# Patient Record
Sex: MALE | Race: WHITE | NOT HISPANIC OR LATINO | URBAN - METROPOLITAN AREA
[De-identification: names, ages, dates, MRNs, and addresses within clinical notes are randomized per-mention and may not be internally consistent; named-entity substitution may affect disease eponyms.]

---

## 2017-06-20 ENCOUNTER — EMERGENCY (EMERGENCY)
Facility: HOSPITAL | Age: 52
LOS: 1 days | Discharge: HOME | End: 2017-06-20
Admitting: EMERGENCY MEDICINE
Payer: COMMERCIAL

## 2017-06-20 VITALS
DIASTOLIC BLOOD PRESSURE: 91 MMHG | HEART RATE: 94 BPM | SYSTOLIC BLOOD PRESSURE: 147 MMHG | OXYGEN SATURATION: 97 % | RESPIRATION RATE: 17 BRPM | TEMPERATURE: 98 F

## 2017-06-20 VITALS — WEIGHT: 160.06 LBS | HEIGHT: 68 IN

## 2017-06-20 DIAGNOSIS — I10 ESSENTIAL (PRIMARY) HYPERTENSION: ICD-10-CM

## 2017-06-20 DIAGNOSIS — E78.5 HYPERLIPIDEMIA, UNSPECIFIED: ICD-10-CM

## 2017-06-20 DIAGNOSIS — R60.0 LOCALIZED EDEMA: ICD-10-CM

## 2017-06-20 DIAGNOSIS — M79.89 OTHER SPECIFIED SOFT TISSUE DISORDERS: ICD-10-CM

## 2017-06-20 LAB
D DIMER BLD IA.RAPID-MCNC: <150 NG/ML DDU — SIGNIFICANT CHANGE UP
HCT VFR BLD CALC: 40.5 % — SIGNIFICANT CHANGE UP (ref 39–50)
HGB BLD-MCNC: 14.5 G/DL — SIGNIFICANT CHANGE UP (ref 13–17)
MCHC RBC-ENTMCNC: 32 PG — SIGNIFICANT CHANGE UP (ref 27–34)
MCHC RBC-ENTMCNC: 35.8 G/DL — SIGNIFICANT CHANGE UP (ref 32–36)
MCV RBC AUTO: 89.4 FL — SIGNIFICANT CHANGE UP (ref 80–100)
PLATELET # BLD AUTO: 187 K/UL — SIGNIFICANT CHANGE UP (ref 150–400)
RBC # BLD: 4.53 M/UL — SIGNIFICANT CHANGE UP (ref 4.2–5.8)
RBC # FLD: 11.8 % — SIGNIFICANT CHANGE UP (ref 10.3–16.9)
WBC # BLD: 4.6 K/UL — SIGNIFICANT CHANGE UP (ref 3.8–10.5)
WBC # FLD AUTO: 4.6 K/UL — SIGNIFICANT CHANGE UP (ref 3.8–10.5)

## 2017-06-20 PROCEDURE — 99284 EMERGENCY DEPT VISIT MOD MDM: CPT

## 2017-06-20 NOTE — ED ADULT NURSE NOTE - CHPI ED SYMPTOMS NEG
no stiffness/no numbness/no fever/no bruising/no weakness/no deformity/no difficulty bearing weight/no back pain/no abrasion/no tingling

## 2017-06-20 NOTE — ED PROVIDER NOTE - OBJECTIVE STATEMENT
50 yo M w/ history of HLD and HTN c/o RLE swelling/redness/tingling 5 days ago. Pt notes onset of symptoms 5 days ago after work; pt had been standing at his standing desk. Pt went to the gym and notes the symptoms resolved after exercise. Pt has been asymptomatic since then. Pt was sent in from PMD's office for evaluation; EKG at PMD's office NSR. Denies break in the skin, paresthesia, numbness, tingling, bleeding, HA, dizziness, SOB, CP, palpitations, N/V, focal weakness. Last long flight was April 2017; approximately 8 hrs. 50 yo M w/ history of HLD and HTN c/o RLE swelling/redness/tingling 5 days ago. Pt notes onset of symptoms 5 days ago after work; pt had been standing at his standing desk. Pt went to the gym and notes the symptoms resolved after exercise. Pt has been asymptomatic since then. Pt was sent in from PMD's office for evaluation; EKG at PMD's office NSR with no acute findings. Denies break in the skin, paresthesia, numbness, tingling, bleeding, HA, dizziness, SOB, CP, palpitations, N/V, focal weakness, trauma, fall, redness, warmth, rash, and palpitations. Last long flight was April 2017; approximately 8 hrs.

## 2017-06-20 NOTE — ED ADULT NURSE NOTE - OBJECTIVE STATEMENT
aox3 verbal, ambulatory well appearing c/o right foot swelling yesterday that has since resolved. denies any cp, no sob.

## 2017-06-20 NOTE — ED PROVIDER NOTE - MEDICAL DECISION MAKING DETAILS
AFVSS at time of d/c, pt non-toxic appearing and hemodynamically stable, results, ddx, and f/u plans discussed with pt at bedside, d/c'd home to f/u with PMD, strict return precautions discussed, prompt return to ER for any worsening or new sx, pt verbalized understanding. pt with atraumatic RLE swelling 5d ago, resolved spontaneously after elevation overnight, currently asymptomatic, physical exam unremarkable, labs with neg d-dimer and cbc wnl, Well's criteria score of 0, unlikely for DVT/PE, AFVSS at time of d/c, pt non-toxic appearing and hemodynamically stable, results, ddx, and f/u plans discussed with pt at bedside, d/c'd home to f/u with PMD, strict return precautions discussed, prompt return to ER for any worsening or new sx, pt verbalized understanding.

## 2017-06-20 NOTE — ED PROVIDER NOTE - CHPI ED SYMPTOMS NEG
no abrasion/no stiffness/no bruising/no numbness/no weakness/no deformity/no difficulty bearing weight/no fever/no tingling

## 2021-05-26 PROBLEM — E78.5 HYPERLIPIDEMIA, UNSPECIFIED: Chronic | Status: ACTIVE | Noted: 2017-06-20

## 2021-05-26 PROBLEM — I10 ESSENTIAL (PRIMARY) HYPERTENSION: Chronic | Status: ACTIVE | Noted: 2017-06-20

## 2021-06-03 ENCOUNTER — APPOINTMENT (OUTPATIENT)
Dept: UROLOGY | Facility: CLINIC | Age: 56
End: 2021-06-03
Payer: COMMERCIAL

## 2021-06-03 ENCOUNTER — NON-APPOINTMENT (OUTPATIENT)
Age: 56
End: 2021-06-03

## 2021-06-03 VITALS
DIASTOLIC BLOOD PRESSURE: 88 MMHG | HEART RATE: 78 BPM | TEMPERATURE: 98.3 F | WEIGHT: 142 LBS | HEIGHT: 68 IN | SYSTOLIC BLOOD PRESSURE: 134 MMHG | BODY MASS INDEX: 21.52 KG/M2

## 2021-06-03 DIAGNOSIS — Z00.00 ENCOUNTER FOR GENERAL ADULT MEDICAL EXAMINATION W/OUT ABNORMAL FINDINGS: ICD-10-CM

## 2021-06-03 PROCEDURE — 99072 ADDL SUPL MATRL&STAF TM PHE: CPT

## 2021-06-03 PROCEDURE — 99204 OFFICE O/P NEW MOD 45 MIN: CPT

## 2021-06-03 NOTE — LETTER BODY
[Dear  ___] : Dear  [unfilled], [Consult Letter:] : I had the pleasure of evaluating your patient, [unfilled]. [Please see my note below.] : Please see my note below. [Consult Closing:] : Thank you very much for allowing me to participate in the care of this patient.  If you have any questions, please do not hesitate to contact me. [FreeTextEntry3] : Best Regards, \par \par Licha Flores MD\par

## 2021-06-03 NOTE — PHYSICAL EXAM
[General Appearance - Well Developed] : well developed [General Appearance - Well Nourished] : well nourished [Normal Appearance] : normal appearance [Well Groomed] : well groomed [General Appearance - In No Acute Distress] : no acute distress [Abdomen Soft] : soft [Abdomen Tenderness] : non-tender [Abdomen Mass (___ Cm)] : no abdominal mass palpated [Costovertebral Angle Tenderness] : no ~M costovertebral angle tenderness [Urethral Meatus] : meatus normal [Penis Abnormality] : normal uncircumcised penis [Urinary Bladder Findings] : the bladder was normal on palpation [Scrotum] : the scrotum was normal [Epididymis] : the epididymides were normal [Testes Tenderness] : no tenderness of the testes [Testes Mass (___cm)] : there were no testicular masses [FreeTextEntry1] : Patient declined PSA and ART since it was just done last week with PCP.

## 2021-06-03 NOTE — ASSESSMENT
[FreeTextEntry1] : We discussed evaluation of his chronic microscopic hematuria and history of renal stone disease and I recommended a CT scan without contrast at the present time a urine culture and cytology and these were ordered we will reconvene in 3 weeks to review these results.  In the meantime I instructed him to notify me if he has any change in his symptomatology and to forward copies of his recent PSA testing to me when it is ready.\par \par As for the left inguinal hernia this is completely asymptomatic and appears more to be a bulge than an actual hernia sac.  I instructed him on the unlikely scenario of incarceration.  We reviewed the symptoms and appropriate action to be taken if they should occur. Licha Flores MD\par

## 2021-06-03 NOTE — HISTORY OF PRESENT ILLNESS
[FreeTextEntry1] : 56 YO M seen TODAY as a NPT.  This patient was a prior patient of Dr. ALTAGRACIA DA SILVA [facility] and was last seen in 2017.  He has a history of a 4 mm right UPJ stone and a 6 mm left renal stone and these have been stable.  He comes today for interval follow-up of these stones.  He denies any renal colic has not passed a stone has no other gross hematuria no dysuria and nocturia once a night.  Patient tells me that he had a recent evaluation by his PCP and this included a ART and PSA evaluation he declines these today past history significant for right inguinal herniorrhaphy as a child.\par \par IPSS  11/35\par CONNOR 15/25\par DULCE 0/10 \par UA small blood.\par \par Patient is on no medication and no known medication allergies.\par \par  The patient denies fevers, chills, nausea and or vomiting and no unexplained weight loss. \par All pertinent parts of the patient PFSH (past medical, family and social histories), laboratory, radiological studies and physician notes were reviewed prior to starting the face to face portion of the telemedicine visit. Questionnaire results were discussed with patient.\par

## 2021-06-04 ENCOUNTER — RESULT CHARGE (OUTPATIENT)
Age: 56
End: 2021-06-04

## 2021-06-04 PROBLEM — Z00.00 ENCOUNTER FOR PREVENTIVE HEALTH EXAMINATION: Status: ACTIVE | Noted: 2021-05-26

## 2021-06-04 LAB
APPEARANCE: CLEAR
BACTERIA: NEGATIVE
BILIRUBIN URINE: NEGATIVE
BLOOD URINE: NEGATIVE
COLOR: NORMAL
GLUCOSE QUALITATIVE U: NEGATIVE
HYALINE CASTS: 0 /LPF
KETONES URINE: NEGATIVE
LEUKOCYTE ESTERASE URINE: NEGATIVE
MICROSCOPIC-UA: NORMAL
NITRITE URINE: NEGATIVE
PH URINE: 6.5
PROTEIN URINE: NEGATIVE
RED BLOOD CELLS URINE: 1 /HPF
SPECIFIC GRAVITY URINE: 1.01
SQUAMOUS EPITHELIAL CELLS: 0 /HPF
UROBILINOGEN URINE: NORMAL
WHITE BLOOD CELLS URINE: 0 /HPF

## 2021-06-07 LAB
BACTERIA UR CULT: NORMAL
BILIRUB UR QL STRIP: NORMAL
CLARITY UR: CLEAR
COLLECTION METHOD: NORMAL
GLUCOSE UR-MCNC: NORMAL
HCG UR QL: 0.2 EU/DL
HGB UR QL STRIP.AUTO: NORMAL
KETONES UR-MCNC: NORMAL
LEUKOCYTE ESTERASE UR QL STRIP: NORMAL
NITRITE UR QL STRIP: NORMAL
PH UR STRIP: 6.5
PROT UR STRIP-MCNC: NORMAL
SP GR UR STRIP: 1.02
URINE CYTOLOGY: NORMAL

## 2021-06-30 ENCOUNTER — APPOINTMENT (OUTPATIENT)
Dept: UROLOGY | Facility: CLINIC | Age: 56
End: 2021-06-30
Payer: COMMERCIAL

## 2021-07-01 ENCOUNTER — APPOINTMENT (OUTPATIENT)
Dept: UROLOGY | Facility: CLINIC | Age: 56
End: 2021-07-01

## 2021-07-14 ENCOUNTER — APPOINTMENT (OUTPATIENT)
Dept: UROLOGY | Facility: CLINIC | Age: 56
End: 2021-07-14
Payer: COMMERCIAL

## 2021-07-14 VITALS — TEMPERATURE: 97.2 F | DIASTOLIC BLOOD PRESSURE: 79 MMHG | HEART RATE: 93 BPM | SYSTOLIC BLOOD PRESSURE: 147 MMHG

## 2021-07-14 PROCEDURE — 99214 OFFICE O/P EST MOD 30 MIN: CPT

## 2021-07-14 PROCEDURE — 99072 ADDL SUPL MATRL&STAF TM PHE: CPT

## 2021-07-14 NOTE — LETTER BODY
[Dear  ___] : Dear  [unfilled], [Courtesy Letter:] : I had the pleasure of seeing your patient, [unfilled], in my office today. [Please see my note below.] : Please see my note below. [Consult Closing:] : Thank you very much for allowing me to participate in the care of this patient.  If you have any questions, please do not hesitate to contact me. [FreeTextEntry3] : Best Regards, \par \par Licha Flores MD\par

## 2021-07-14 NOTE — HISTORY OF PRESENT ILLNESS
[FreeTextEntry1] : 54 YO M seen 6/3/2021 as a NPT.  This patient was a prior patient of Dr. ALTAGRACIA BERMUDEZ and was last seen in 2017.  He has a history of a 4 mm right UPJ stone and a 6 mm left renal stone and these have been stable.  He comes today for interval follow-up of these stones.  He denies any renal colic has not passed a stone has no other gross hematuria no dysuria and nocturia once a night.  Patient tells me that he had a recent evaluation by his PCP and this included a ART and PSA evaluation he declines these today past history significant for right inguinal herniorrhaphy as a child.\par \par IPSS  11/35\par CONNOR 15/25\par DULCE 0/10 \par UA small blood.\par \par URine cytology negative, C+S negative.\par CT scan 7/6/2021 9 x 10 x 7 CM left simple cyst, 1 mm left stone, 4 mm right stone chronic. PSA done by PCP by history.\par \par Patient seen TODAY 7/14/2021 to review CT scan results and plan next steps. He has been asymptomatic since last visit. No gross hematuria.\par PSA from 5/20/2021 1.1 done at PCP, patient showed me the results from the patient portal on his phone. \par \par UA trace heme today\par IPSS 13/35\par CONNOR 25/25\par DULCE 5/10\par \par Patient is on no medication and no known medication allergies.\par \par  The patient denies fevers, chills, nausea and or vomiting and no unexplained weight loss. \par All pertinent parts of the patient PFSH (past medical, family and social histories), laboratory, radiological studies and physician notes were reviewed prior to starting the face to face portion of the visit. Questionnaire results were discussed with patient.\par

## 2021-07-14 NOTE — ASSESSMENT
[FreeTextEntry1] : We discussed the results of the CT scan today with respect to the bilateral kidney stones which are not causing obstruction or symptoms at this point and the large left simple renal cyst which again is not causing any symptoms at this point.  I recommended that he undergo a KUB x-ray at this time to confirm the size and density of the renal calculi.  We will continue to follow the left renal cyst with ultrasound in the future.\par \par If the patient remains asymptomatic we will plan to follow up in 6 months. Licha Flores MD\par

## 2022-01-12 ENCOUNTER — APPOINTMENT (OUTPATIENT)
Dept: UROLOGY | Facility: CLINIC | Age: 57
End: 2022-01-12

## 2022-01-12 NOTE — HISTORY OF PRESENT ILLNESS
[FreeTextEntry1] : 57 YO M seen 6/3/2021 as a NPT.  This patient was a prior patient of Dr. ALTAGRACIA DA SILVA [facility] and was last seen in 2017.  He has a history of a 4 mm right UPJ stone and a 6 mm left renal stone and these have been stable.  He comes today for interval follow-up of these stones.  He denies any renal colic has not passed a stone has no other gross hematuria no dysuria and nocturia once a night.  Patient tells me that he had a recent evaluation by his PCP and this included a ART and PSA evaluation he declines these today past history significant for right inguinal herniorrhaphy as a child.\par \par IPSS  11/35\par CONNOR 15/25\par DULCE 0/10 \par UA small blood.\par \par Urine cytology negative, C+S negative.\par CT scan 7/6/2021 9 x 10 x 7 CM left simple cyst, 1 mm left stone, 4 mm right stone chronic. PSA done by PCP by history.\par \par Patient seen  7/14/2021 to review CT scan results and plan next steps. He has been asymptomatic since last visit. No gross hematuria.\par PSA from 5/20/2021 1.1 done at PCP, patient showed me the results from the patient portal on his phone. \par UA trace heme\par IPSS 13/35\par CONNOR 25/25\par DULCE 5/10\par We planned to follow the asymptomatic stones with KUB X-ray and periodic renal US.\par \par Patient cancelled 1/12/2022, home with COVID. \par \par

## 2022-02-09 ENCOUNTER — APPOINTMENT (OUTPATIENT)
Dept: UROLOGY | Facility: CLINIC | Age: 57
End: 2022-02-09

## 2022-02-09 NOTE — HISTORY OF PRESENT ILLNESS
[FreeTextEntry1] : 55 YO M seen 6/3/2021 as a NPT.  This patient was a prior patient of Dr. ALATGRACIA DA SILVA [facility] and was last seen in 2017.  He has a history of a 4 mm right UPJ stone and a 6 mm left renal stone and these have been stable.  He comes today for interval follow-up of these stones.  He denies any renal colic has not passed a stone has no other gross hematuria no dysuria and nocturia once a night.  Patient tells me that he had a recent evaluation by his PCP and this included a ART and PSA evaluation he declines these today past history significant for right inguinal herniorrhaphy as a child.\par \par IPSS  11/35\par CONNOR 15/25\par DULCE 0/10 \par UA small blood.\par \par Urine cytology negative, C+S negative.\par CT scan 7/6/2021 9 x 10 x 7 CM left simple cyst, 1 mm left stone, 4 mm right stone chronic. PSA done by PCP by history.\par \par Patient seen  7/14/2021 to review CT scan results and plan next steps. He has been asymptomatic since last visit. No gross hematuria.\par PSA from 5/20/2021 1.1 done at PCP, patient showed me the results from the patient portal on his phone. \par UA trace heme\par IPSS 13/35\par CONNOR 25/25\par DULCE 5/10\par We planned to follow the asymptomatic stones with KUB X-ray and periodic renal US.\par \par Patient cancelled 1/12/2022, home with COVID. \par \par Patient seen TODAY 2/9/2022 for interval FU.\par \par

## 2022-02-24 ENCOUNTER — APPOINTMENT (OUTPATIENT)
Dept: UROLOGY | Facility: CLINIC | Age: 57
End: 2022-02-24

## 2022-03-10 ENCOUNTER — APPOINTMENT (OUTPATIENT)
Dept: UROLOGY | Facility: CLINIC | Age: 57
End: 2022-03-10

## 2022-04-21 ENCOUNTER — APPOINTMENT (OUTPATIENT)
Dept: UROLOGY | Facility: CLINIC | Age: 57
End: 2022-04-21
Payer: COMMERCIAL

## 2022-04-21 VITALS
HEART RATE: 99 BPM | BODY MASS INDEX: 21.67 KG/M2 | SYSTOLIC BLOOD PRESSURE: 138 MMHG | WEIGHT: 143 LBS | DIASTOLIC BLOOD PRESSURE: 89 MMHG | TEMPERATURE: 98.3 F | HEIGHT: 68 IN

## 2022-04-21 DIAGNOSIS — K40.90 UNILATERAL INGUINAL HERNIA, W/OUT OBSTRUCTION OR GANGRENE, NOT SPECIFIED AS RECURRENT: ICD-10-CM

## 2022-04-21 PROCEDURE — 99214 OFFICE O/P EST MOD 30 MIN: CPT

## 2022-04-21 PROCEDURE — 76775 US EXAM ABDO BACK WALL LIM: CPT

## 2022-04-21 NOTE — PHYSICAL EXAM
[General Appearance - Well Developed] : well developed [General Appearance - Well Nourished] : well nourished [Normal Appearance] : normal appearance [Well Groomed] : well groomed [General Appearance - In No Acute Distress] : no acute distress [Abdomen Soft] : soft [Abdomen Tenderness] : non-tender [Costovertebral Angle Tenderness] : no ~M costovertebral angle tenderness [Urethral Meatus] : meatus normal [Penis Abnormality] : normal circumcised penis [Scrotum] : the scrotum was normal [Epididymis] : the epididymides were normal [Testes Tenderness] : no tenderness of the testes [Testes Mass (___cm)] : there were no testicular masses [Prostate Tenderness] : the prostate was not tender [No Prostate Nodules] : no prostate nodules [Prostate Size ___ (0-4)] : prostate size [unfilled] (scale: 0-4) [Edema] : no peripheral edema [] : no respiratory distress [Respiration, Rhythm And Depth] : normal respiratory rhythm and effort [Exaggerated Use Of Accessory Muscles For Inspiration] : no accessory muscle use [Oriented To Time, Place, And Person] : oriented to person, place, and time [Affect] : the affect was normal [Mood] : the mood was normal [Not Anxious] : not anxious [Normal Station and Gait] : the gait and station were normal for the patient's age [No Focal Deficits] : no focal deficits [FreeTextEntry1] : LIH with slight bulge but no palpable sac, stable from exam last year.

## 2022-04-21 NOTE — HISTORY OF PRESENT ILLNESS
[FreeTextEntry1] : 57 YO M seen 6/3/2021 as a NPT.  This patient was a prior patient of Dr. ALTAGRACIA BERMUDEZ and was last seen in 2017.  He has a history of a 4 mm right UPJ stone and a 6 mm left renal stone and these have been stable.  He comes today for interval follow-up of these stones.  He denies any renal colic has not passed a stone has no other gross hematuria no dysuria and nocturia once a night.  Patient tells me that he had a recent evaluation by his PCP and this included a ART and PSA evaluation he declines these today past history significant for right inguinal herniorrhaphy as a child.\par \par IPSS  11/35\par CONNOR 15/25\par DULCE 0/10 \par UA small blood.\par \par Urine cytology negative, C+S negative.\par CT scan 7/6/2021 9 x 10 x 7 CM left simple cyst, 1 mm left stone, 4 mm right stone chronic. PSA done by PCP by history.\par \par Patient seen  7/14/2021 to review CT scan results and plan next steps. He has been asymptomatic since last visit. No gross hematuria.\par PSA from 5/20/2021 1.1 done at PCP, patient showed me the results from the patient portal on his phone. \par UA trace heme\par IPSS 13/35\par CONNOR 25/25\par DULCE 5/10\par We planned to follow the asymptomatic stones with KUB X-ray and periodic renal US.\par \par Patient cancelled 1/12/2022, home with [facility], cancelled 2/29/2022. \par \par Patient seen TODAY 4/21/2022 for interval FU. HA has been asymptomatic. No gross hematuria or renal colic. He had a KUB X-ray 4/20/2022. Report not ready, but I reviewed the films on the [facility] portal, no stones seen, bilateral pelvic phleboliths. He has noted no change in the LIH bulge.\par UA moderate RBC\par Renal US (in office): Mildly dilated lower pole calyces, 4 mm likely upper pole stone, not obstructing. stable 10 cm left simple cyst, no hydronephrosis or stones on the left. \par \par  The patient denies fevers, chills, nausea and or vomiting and no unexplained weight loss. \par All pertinent parts of the patient PFSH (past medical, family and social histories), laboratory, radiological studies and physician notes were reviewed prior to starting the face to face portion of the  visit. Questionnaire results were discussed with patient.\par \par \par

## 2022-04-21 NOTE — ASSESSMENT
[FreeTextEntry1] : We discussed today the results of the patient's physical examination, KUB x-ray, renal ultrasound.  These indicate stable small radiolucent right renal calculus, stable large left simple cyst.  He also has a left inguinal hernia which is primarily bulge and not a sac.  This has also remained stable over the past year.\par \par I discussed and recommended that he continue to follow these issues without active intervention at this time.  We reviewed signs and symptoms of incarceration of the hernia and appropriate steps to take if they should occur.  As for the small kidney stone, we will follow this up in 1 year with a repeat renal ultrasound unless his symptoms change in the interim.  We will also follow the left simple cyst since it does not require intervention right now.\par \par I also sent urine today for culture and cytology blood for PSA and BMP. Licha Flores MD\par

## 2022-04-22 LAB
ANION GAP SERPL CALC-SCNC: 12 MMOL/L
BILIRUB UR QL STRIP: NORMAL
BUN SERPL-MCNC: 11 MG/DL
CALCIUM SERPL-MCNC: 9.8 MG/DL
CHLORIDE SERPL-SCNC: 105 MMOL/L
CO2 SERPL-SCNC: 23 MMOL/L
CREAT SERPL-MCNC: 0.79 MG/DL
EGFR: 105 ML/MIN/1.73M2
GLUCOSE SERPL-MCNC: 82 MG/DL
GLUCOSE UR-MCNC: NORMAL
HCG UR QL: 0.2 EU/DL
HGB UR QL STRIP.AUTO: NORMAL
KETONES UR-MCNC: NORMAL
LEUKOCYTE ESTERASE UR QL STRIP: NORMAL
NITRITE UR QL STRIP: NORMAL
PH UR STRIP: 7
POTASSIUM SERPL-SCNC: 4.2 MMOL/L
PROT UR STRIP-MCNC: NORMAL
PSA SERPL-MCNC: 1.75 NG/ML
SODIUM SERPL-SCNC: 140 MMOL/L
SP GR UR STRIP: 1.01

## 2022-04-27 LAB — BACTERIA UR CULT: NORMAL

## 2022-04-28 LAB — URINE CYTOLOGY: NORMAL

## 2022-11-02 ENCOUNTER — APPOINTMENT (OUTPATIENT)
Dept: UROLOGY | Facility: CLINIC | Age: 57
End: 2022-11-02

## 2022-11-02 ENCOUNTER — RESULT CHARGE (OUTPATIENT)
Age: 57
End: 2022-11-02

## 2022-11-02 VITALS — SYSTOLIC BLOOD PRESSURE: 146 MMHG | DIASTOLIC BLOOD PRESSURE: 90 MMHG | HEART RATE: 80 BPM | TEMPERATURE: 98.4 F

## 2022-11-02 PROCEDURE — 99213 OFFICE O/P EST LOW 20 MIN: CPT

## 2022-11-02 NOTE — PHYSICAL EXAM
[General Appearance - Well Developed] : well developed [General Appearance - Well Nourished] : well nourished [Normal Appearance] : normal appearance [Well Groomed] : well groomed [General Appearance - In No Acute Distress] : no acute distress [Abdomen Soft] : soft [Abdomen Mass (___ Cm)] : no abdominal mass palpated [Costovertebral Angle Tenderness] : no ~M costovertebral angle tenderness [FreeTextEntry1] : Mild tenderness in RLQ to deep palpation, no rebound tenderness. [Oriented To Time, Place, And Person] : oriented to person, place, and time [Affect] : the affect was normal [Mood] : the mood was normal [Not Anxious] : not anxious

## 2022-11-02 NOTE — HISTORY OF PRESENT ILLNESS
[FreeTextEntry1] : 55 YO M seen 6/3/2021 as a NPT.  This patient was a prior patient of Dr. ALTAGRACIA BERMUDEZ and was last seen in 2017.  He has a history of a 4 mm right UPJ stone and a 6 mm left renal stone and these have been stable.  He comes today for interval follow-up of these stones.  He denies any renal colic has not passed a stone has no other gross hematuria no dysuria and nocturia once a night.  Patient tells me that he had a recent evaluation by his PCP and this included a ART and PSA evaluation he declines these today past history significant for right inguinal herniorrhaphy as a child.\par \par IPSS  11/35\par CONNOR 15/25\par DULCE 0/10 \par UA small blood.\par \par Urine cytology negative, C+S negative.\par CT scan 7/6/2021 9 x 10 x 7 CM left simple cyst, 1 mm left stone, 4 mm right stone chronic. PSA done by PCP by history.\par \par Patient seen  7/14/2021 to review CT scan results and plan next steps. He has been asymptomatic since last visit. No gross hematuria.\par PSA from 5/20/2021 1.1 done at PCP, patient showed me the results from the patient portal on his phone. \par UA trace heme\par IPSS 13/35\par CONNOR 25/25\par DULCE 5/10\par We planned to follow the asymptomatic stones with KUB X-ray and periodic renal US.\par \par Patient cancelled 1/12/2022, home with [facility], cancelled 2/29/2022. \par \par Patient seen  4/21/2022 for interval FU. HA has been asymptomatic. No gross hematuria or renal colic. He had a KUB X-ray 4/20/2022. Report not ready, but I reviewed the films on the [facility] portal, no stones seen, bilateral pelvic phleboliths. He has noted no change in the LIH bulge.\par UA moderate RBC\par Renal US (in office): Mildly dilated lower pole calyces, 4 mm likely upper pole stone, not obstructing. stable 10 cm left simple cyst, no hydronephrosis or stones on the left. \par We discussed today the results of the patient's physical examination, KUB x-ray, renal ultrasound. These indicate stable small radiolucent right renal calculus, stable large left simple cyst. He also has a left inguinal hernia which is primarily bulge and not a sac. This has also remained stable over the past year.\par I discussed and recommended that he continue to follow these issues without active intervention at this time. We reviewed signs and symptoms of incarceration of the hernia and appropriate steps to take if they should occur. As for the small kidney stone, we will follow this up in 1 year with a repeat renal ultrasound unless his symptoms change in the interim. We will also follow the left simple cyst since it does not require intervention right now.\par I also sent urine today for culture and cytology blood for PSA and BMP. \par PSA 1.75\par BMP normal\par C+S\par \par Patient seen TODAY 11/2/2022 due to right flank pain radiating to the RLQ which started 3 weeks ago this was constant and severe at times but resolved 1 week ago. He now has a tingling feeling in the penis and frequency and urgency. He has not captured any stones but feels that one is passing. Denies gross hematuria.\par UA trace RBC\par IPSS 21\par \par  The patient denies fevers, chills, nausea and or vomiting and no unexplained weight loss. \par All pertinent parts of the patient PFSH (past medical, family and social histories), laboratory, radiological studies and physician notes were reviewed prior to starting the face to face portion of the  visit. Questionnaire results were discussed with patient.\par \par \par

## 2022-11-02 NOTE — ASSESSMENT
[FreeTextEntry1] : Findings today are consistent with the process of either passing or having passed the known 3 mm right upper pole kidney stone.  I sent urine for culture today and asked the patient to return to the office later in the week 04 renal and pelvic ultrasound to confirm whether the stone is still present and to identify any signs of obstruction. Licha Flores MD\par

## 2022-11-04 ENCOUNTER — APPOINTMENT (OUTPATIENT)
Dept: UROLOGY | Facility: CLINIC | Age: 57
End: 2022-11-04

## 2022-11-04 LAB
BACTERIA UR CULT: NORMAL
BILIRUB UR QL STRIP: NORMAL
CLARITY UR: CLEAR
COLLECTION METHOD: NORMAL
GLUCOSE UR-MCNC: NORMAL
HCG UR QL: 0.2 EU/DL
HGB UR QL STRIP.AUTO: NORMAL
KETONES UR-MCNC: NORMAL
LEUKOCYTE ESTERASE UR QL STRIP: NORMAL
NITRITE UR QL STRIP: NORMAL
PH UR STRIP: 6
PROT UR STRIP-MCNC: NORMAL
SP GR UR STRIP: 1.02

## 2022-11-04 PROCEDURE — 76775 US EXAM ABDO BACK WALL LIM: CPT

## 2022-11-04 PROCEDURE — 99213 OFFICE O/P EST LOW 20 MIN: CPT

## 2022-11-04 PROCEDURE — 81003 URINALYSIS AUTO W/O SCOPE: CPT | Mod: QW

## 2022-11-04 PROCEDURE — 76857 US EXAM PELVIC LIMITED: CPT | Mod: 59

## 2022-11-04 NOTE — HISTORY OF PRESENT ILLNESS
[FreeTextEntry1] : 56 YO M seen 6/3/2021 as a NPT.  This patient was a prior patient of Dr. ALTAGRACIA BERMUDEZ and was last seen in 2017.  He has a history of a 4 mm right UPJ stone and a 6 mm left renal stone and these have been stable.  He comes today for interval follow-up of these stones.  He denies any renal colic has not passed a stone has no other gross hematuria no dysuria and nocturia once a night.  Patient tells me that he had a recent evaluation by his PCP and this included a ART and PSA evaluation he declines these today past history significant for right inguinal herniorrhaphy as a child.\par \par IPSS  11/35\par CONNOR 15/25\par DULCE 0/10 \par UA small blood.\par \par Urine cytology negative, C+S negative.\par CT scan 7/6/2021 9 x 10 x 7 CM left simple cyst, 1 mm left stone, 4 mm right stone chronic. PSA done by PCP by history.\par \par Patient seen  7/14/2021 to review CT scan results and plan next steps. He has been asymptomatic since last visit. No gross hematuria.\par PSA from 5/20/2021 1.1 done at PCP, patient showed me the results from the patient portal on his phone. \par UA trace heme\par IPSS 13/35\par CONNOR 25/25\par DULCE 5/10\par We planned to follow the asymptomatic stones with KUB X-ray and periodic renal US.\par \par Patient cancelled 1/12/2022, home with [facility], cancelled 2/29/2022. \par \par Patient seen  4/21/2022 for interval FU. HA has been asymptomatic. No gross hematuria or renal colic. He had a KUB X-ray 4/20/2022. Report not ready, but I reviewed the films on the [facility] portal, no stones seen, bilateral pelvic phleboliths. He has noted no change in the LIH bulge.\par UA moderate RBC\par Renal US (in office): Mildly dilated lower pole calyces, 4 mm likely upper pole stone, not obstructing. stable 10 cm left simple cyst, no hydronephrosis or stones on the left. \par We discussed today the results of the patient's physical examination, KUB x-ray, renal ultrasound. These indicate stable small radiolucent right renal calculus, stable large left simple cyst. He also has a left inguinal hernia which is primarily bulge and not a sac. This has also remained stable over the past year.\par I discussed and recommended that he continue to follow these issues without active intervention at this time. We reviewed signs and symptoms of incarceration of the hernia and appropriate steps to take if they should occur. As for the small kidney stone, we will follow this up in 1 year with a repeat renal ultrasound unless his symptoms change in the interim. We will also follow the left simple cyst since it does not require intervention right now.\par I also sent urine today for culture and cytology blood for PSA and BMP. \par PSA 1.75\par BMP normal\par C+S\par \par Patient seen  11/2/2022 due to right flank pain radiating to the RLQ which started 3 weeks ago this was constant and severe at times but resolved 1 week ago. He now has a tingling feeling in the penis and frequency and urgency. He has not captured any stones but feels that one is passing. Denies gross hematuria.\par UA trace RBC\par IPSS 21\par Findings today are consistent with the process of either passing or having passed the known 3 mm right upper pole kidney stone. I sent urine for culture today and asked the patient to return to the office later in the week for renal and pelvic ultrasound to confirm whether the stone is still present and to identify any signs of obstruction. \par C+S NA\par \par Patient seen TODAY 11/4/2022 for Renal and Pelvic US.\par UA trace RBC\par Renal US: left 11 cm simple cyst unchanged, no stones. right small simple cyst, prior stone not seen, no hydronephrosis or mass effect either kidney.\par Pelvic US: PVR 6 cc , 5 mm stone mobile in the bladder, Prostate 33 gm.\par \par  The patient denies fevers, chills, nausea and or vomiting and no unexplained weight loss. \par All pertinent parts of the patient PFSH (past medical, family and social histories), laboratory, radiological studies and physician notes were reviewed prior to starting the face to face portion of the  visit. Questionnaire results were discussed with patient.\par \par \par

## 2022-11-04 NOTE — ASSESSMENT
[FreeTextEntry1] : We reviewed the results of the renal and pelvic ultrasound today which demonstrated no hydronephrosis and the passage of the known right kidney stone into the bladder.  The stone was found to be mobile in the bladder and not obstructing the distal ureter.  We discussed likely passage of the stone with minimal symptoms I recommended he try to capture the stone for analysis.  We also sent urine today for culture and cytology and we will review these results as they come in.  We made plans for follow-up in 6 weeks to repeat the bladder ultrasound to confirm stone passage if he does not capture the stone. Licha Flores MD\par

## 2022-11-04 NOTE — LETTER BODY
[Dear  ___] : Dear  [unfilled], [Courtesy Letter:] : I had the pleasure of seeing your patient, [unfilled], in my office today. [Please see my note below.] : Please see my note below. [Consult Closing:] : Thank you very much for allowing me to participate in the care of this patient.  If you have any questions, please do not hesitate to contact me. [FreeTextEntry3] : Licha Flores MD\par

## 2022-11-07 ENCOUNTER — NON-APPOINTMENT (OUTPATIENT)
Age: 57
End: 2022-11-07

## 2022-11-07 LAB — BACTERIA UR CULT: NORMAL

## 2022-11-09 ENCOUNTER — NON-APPOINTMENT (OUTPATIENT)
Age: 57
End: 2022-11-09

## 2022-11-09 LAB — URINE CYTOLOGY: NORMAL

## 2022-11-16 LAB — NIDUS STONE QN: NORMAL

## 2023-10-10 ENCOUNTER — APPOINTMENT (OUTPATIENT)
Dept: UROLOGY | Facility: CLINIC | Age: 58
End: 2023-10-10
Payer: COMMERCIAL

## 2023-10-10 VITALS
OXYGEN SATURATION: 96 % | WEIGHT: 143 LBS | HEIGHT: 68 IN | TEMPERATURE: 98.2 F | DIASTOLIC BLOOD PRESSURE: 90 MMHG | BODY MASS INDEX: 21.67 KG/M2 | SYSTOLIC BLOOD PRESSURE: 146 MMHG

## 2023-10-10 DIAGNOSIS — R10.32 LEFT LOWER QUADRANT PAIN: ICD-10-CM

## 2023-10-10 PROCEDURE — 99214 OFFICE O/P EST MOD 30 MIN: CPT

## 2023-10-11 LAB
APPEARANCE: CLEAR
BACTERIA: NEGATIVE /HPF
BILIRUBIN URINE: NEGATIVE
BLOOD URINE: NEGATIVE
CAST: 0 /LPF
COLOR: YELLOW
EPITHELIAL CELLS: 0 /HPF
GLUCOSE QUALITATIVE U: NEGATIVE MG/DL
KETONES URINE: NEGATIVE MG/DL
LEUKOCYTE ESTERASE URINE: NEGATIVE
MICROSCOPIC-UA: NORMAL
NITRITE URINE: NEGATIVE
PH URINE: 5.5
PROTEIN URINE: NEGATIVE MG/DL
RED BLOOD CELLS URINE: 2 /HPF
SPECIFIC GRAVITY URINE: 1.02
UROBILINOGEN URINE: 0.2 MG/DL
WHITE BLOOD CELLS URINE: 0 /HPF

## 2023-10-12 ENCOUNTER — NON-APPOINTMENT (OUTPATIENT)
Age: 58
End: 2023-10-12

## 2023-10-12 LAB — BACTERIA UR CULT: NORMAL

## 2023-10-25 LAB — URINE CYTOLOGY: NORMAL

## 2023-11-02 ENCOUNTER — APPOINTMENT (OUTPATIENT)
Dept: UROLOGY | Facility: CLINIC | Age: 58
End: 2023-11-02
Payer: COMMERCIAL

## 2023-11-02 DIAGNOSIS — R79.89 OTHER SPECIFIED ABNORMAL FINDINGS OF BLOOD CHEMISTRY: ICD-10-CM

## 2023-11-02 DIAGNOSIS — N20.0 CALCULUS OF KIDNEY: ICD-10-CM

## 2023-11-02 DIAGNOSIS — N50.812 LEFT TESTICULAR PAIN: ICD-10-CM

## 2023-11-02 LAB
BILIRUB UR QL STRIP: NORMAL
CLARITY UR: CLEAR
COLLECTION METHOD: NORMAL
GLUCOSE UR-MCNC: NORMAL
HCG UR QL: 0.2 EU/DL
HGB UR QL STRIP.AUTO: ABNORMAL
KETONES UR-MCNC: NORMAL
LEUKOCYTE ESTERASE UR QL STRIP: NORMAL
NITRITE UR QL STRIP: NORMAL
PH UR STRIP: 6
PROT UR STRIP-MCNC: NORMAL
SP GR UR STRIP: 1.02

## 2023-11-02 PROCEDURE — 99214 OFFICE O/P EST MOD 30 MIN: CPT

## 2023-11-02 PROCEDURE — 76870 US EXAM SCROTUM: CPT

## 2023-11-02 PROCEDURE — 76775 US EXAM ABDO BACK WALL LIM: CPT

## 2023-11-02 PROCEDURE — 81003 URINALYSIS AUTO W/O SCOPE: CPT | Mod: QW

## 2023-11-14 ENCOUNTER — NON-APPOINTMENT (OUTPATIENT)
Age: 58
End: 2023-11-14

## 2023-11-15 LAB
BACTERIA UR CULT: NORMAL
ESTRADIOL SERPL-MCNC: 13 PG/ML
FSH SERPL-MCNC: 12.3 IU/L
LH SERPL-ACNC: 7.2 IU/L
PROLACTIN SERPL-MCNC: 6.4 NG/ML
TESTOST FREE SERPL-MCNC: 4.4 PG/ML
TESTOST SERPL-MCNC: 409 NG/DL
URINE CYTOLOGY: NORMAL

## 2023-11-30 ENCOUNTER — APPOINTMENT (OUTPATIENT)
Dept: CT IMAGING | Facility: CLINIC | Age: 58
End: 2023-11-30

## 2024-02-20 ENCOUNTER — APPOINTMENT (OUTPATIENT)
Dept: UROLOGY | Facility: CLINIC | Age: 59
End: 2024-02-20
Payer: COMMERCIAL

## 2024-02-20 DIAGNOSIS — N28.1 CYST OF KIDNEY, ACQUIRED: ICD-10-CM

## 2024-02-20 PROCEDURE — 99214 OFFICE O/P EST MOD 30 MIN: CPT

## 2024-02-20 NOTE — LETTER BODY
[Dear  ___] : Dear  [unfilled], [Courtesy Letter:] : I had the pleasure of seeing your patient, [unfilled], in my office today. [Please see my note below.] : Please see my note below. [Consult Closing:] : Thank you very much for allowing me to participate in the care of this patient.  If you have any questions, please do not hesitate to contact me. [FreeTextEntry3] : Best Regards,   Licha Flores MD

## 2024-02-20 NOTE — ASSESSMENT
[FreeTextEntry1] : We discussed the findings of the patient's recent CT scan which included normal upper urinary tract except for the bilateral simple renal cysts.  I reviewed with him the benign nature of the cyst and we discussed that intervention is unnecessary at this time for the cysts.  As for the small fat-containing right inguinal hernia seen on CT scan, he had a laparoscopic left inguinal hernia repair last year and this was not confirmed under direct visualization.  The CT finding is likely the residual after his previous right inguinal herniorrhaphy performed through open procedure at age 8.  He remains asymptomatic from this and no intervention is indicated at this time.  As for the chronic microscopic hematuria, I recommended that we continue and complete his evaluation with cystoscopy in the office under local anesthesia with nitrous oxide augmentation.  We reviewed the indications risks alternatives and chances for success with this approach and he is in agreement.  Staff will contact him to schedule this procedure. Licha Flores MD

## 2024-02-20 NOTE — HISTORY OF PRESENT ILLNESS
[Home] : at home, [unfilled] , at the time of the visit. [Medical Office: ([facility])___] : at the medical office located in  [Verbal consent obtained from patient] : the patient, [unfilled] [FreeTextEntry1] :  The patient-doctor relationship has been established in a face to face fashion via real time video/audio HIPAA compliant communication using telemedicine software. The patient's identity has been confirmed. The patient was previously emailed a copy of the telemedicine consent. They have had a chance to review and has now given verbal consent and has requested care to be assessed and treated through telemedicine and understands there maybe limitations in this process and they may need further follow up care in the office and or hospital settings.  Verbal consent given on 2/20/2024, at 8:30 AM, by Flo Cam.  59 YO M seen 6/3/2021 as a NPT.  This patient was a prior patient of Dr. ALTAGRACIA BERMUDEZ and was last seen in 2017.  He has a history of a 4 mm right UPJ stone and a 6 mm left renal stone and these have been stable.  He comes today for interval follow-up of these stones.  He denies any renal colic has not passed a stone has no other gross hematuria no dysuria and nocturia once a night.  Patient tells me that he had a recent evaluation by his PCP and this included a ART and PSA evaluation he declines these today past history significant for right inguinal herniorrhaphy as a child.  IPSS  11/35 CONNOR 15/25 DULCE 0/10  UA small blood.  Urine cytology negative, C+S negative. CT scan 7/6/2021 9 x 10 x 7 CM left simple cyst, 1 mm left stone, 4 mm right stone chronic. PSA done by PCP by history.  Patient seen  7/14/2021 to review CT scan results and plan next steps. He has been asymptomatic since last visit. No gross hematuria. PSA from 5/20/2021 1.1 done at PCP, patient showed me the results from the patient portal on his phone.  UA trace heme IPSS 13/35 CONNOR 25/25 DULCE 5/10 We planned to follow the asymptomatic stones with KUB X-ray and periodic renal US.  Patient cancelled 1/12/2022, home with COVID, cancelled 2/29/2022.   Patient seen  4/21/2022 for interval FU. HA has been asymptomatic. No gross hematuria or renal colic. He had a KUB X-ray 4/20/2022. Report not ready, but I reviewed the films on the [facility] portal, no stones seen, bilateral pelvic phleboliths. He has noted no change in the LIH bulge. UA moderate RBC Renal US (in office): Mildly dilated lower pole calyces, 4 mm likely upper pole stone, not obstructing. stable 10 cm left simple cyst, no hydronephrosis or stones on the left.  We discussed today the results of the patient's physical examination, KUB x-ray, renal ultrasound. These indicate stable small radiolucent right renal calculus, stable large left simple cyst. He also has a left inguinal hernia which is primarily bulge and not a sac. This has also remained stable over the past year. I discussed and recommended that he continue to follow these issues without active intervention at this time. We reviewed signs and symptoms of incarceration of the hernia and appropriate steps to take if they should occur. As for the small kidney stone, we will follow this up in 1 year with a repeat renal ultrasound unless his symptoms change in the interim. We will also follow the left simple cyst since it does not require intervention right now. I also sent urine today for culture and cytology blood for PSA and BMP.  PSA 1.75 BMP normal C+S  Patient seen  11/2/2022 due to right flank pain radiating to the RLQ which started 3 weeks ago this was constant and severe at times but resolved 1 week ago. He now has a tingling feeling in the penis and frequency and urgency. He has not captured any stones but feels that one is passing. Denies gross hematuria. UA trace RBC IPSS 21 Findings today are consistent with the process of either passing or having passed the known 3 mm right upper pole kidney stone. I sent urine for culture today and asked the patient to return to the office later in the week for renal and pelvic ultrasound to confirm whether the stone is still present and to identify any signs of obstruction.  C+S NA  Patient seen 11/4/2022 for Renal and Pelvic US. UA trace RBC Renal US: left 11 cm simple cyst unchanged, no stones. right small simple cyst, prior stone not seen, no hydronephrosis or mass effect either kidney. Pelvic US: PVR 6 cc , 5 mm stone mobile in the bladder, Prostate 33 gm.  The patient denies fevers, chills, nausea and or vomiting and no unexplained weight loss.  All pertinent parts of the patient PFSH (past medical, family and social histories), laboratory, radiological studies and physician notes were reviewed prior to starting the face to face portion of the  visit. Questionnaire results were discussed with patient. We reviewed the results of the renal and pelvic ultrasound today which demonstrated no hydronephrosis and the passage of the known right kidney stone into the bladder. The stone was found to be mobile in the bladder and not obstructing the distal ureter. We discussed likely passage of the stone with minimal symptoms I recommended he try to capture the stone for analysis. We also sent urine today for culture and cytology and we will review these results as they come in. We made plans for follow-up in 6 weeks to repeat the bladder ultrasound to confirm stone passage if he does not capture the stone. C+S, cytology negative.  Patient seen 10/10/2023 to reassess. He told me that he had LIH repair with mesh 2/2023 and since then he has had persistent discomfort in the left groin and testis. He also recently had a TT level that returned significantly lower than last year at 51 (7/26/2023). At the same time, his PSA was found to be stable at 1.0. (Results reviewed on patient's phone). He denies any gross hematuria. UA trace RBC IPSS 12 DULCE 4 CONNOR not completed.  We discussed the patient's persistent microscopic hematuria by dipstick examination today and I recommended that we confirm this by microscopic evaluation, urine culture and urine cytology. These tests were sent. As to the persistent discomfort in the left groin and testicular area today associated with a very low testosterone level of 51, I recommended that he come for a full morning hormonal panel next week and come the following week for a scrotal ultrasound. At the same time we will also check on his potential stone burden with a renal ultrasound. I discussed with the patient the indications risks alternatives and chances for success with this approach and he is in agreement plans were made.  Micro UA, C+S, cytology all negative.  Patient seen 11/2/2023 for Renal and scrotal US and to reassess. He continues to have intermittent discomfort in the left testis. Denies any stone passage or gross hematuria, although he has had microscopic hematuria chronically, but always related to kidney stones. UA Small RBC Renal US: NO renal masses, 1.4 cm right renal cyst, 11.7 cm left simple cyst (this has increased ~1cm/year over the last 2 years, PVR 9 ml, Prostate 30 gm. Scrotal US: No masses, normal vasculature, no varicocele, normal epididymides. Right testis atrophic 5.6 ml, Left 11 ml.  We discussed the patient's chronic microscopic hematuria which is again present today with a small amount of blood seen. This is seen in the absence of any kidney stones by renal ultrasound today. He has never had an evaluation with cystoscopy for this chronic issue. I recommended that we consider CT scan of the abdomen and pelvis and I repeated a culture and cytology today. As for the history of kidney stones, none were identified by ultrasound today however we will check more closely with CT scan as noted above. As for the bilateral renal cysts and the enlarging left renal cyst, these will also be evaluated further with a CT scan. I also recommended that we consider cystoscopy after the CT scan. We discussed the indications, risks, alternatives and chances for success with this approach. As for the low testosterone level noted previously, a full hormone panel was sent today at approximately 11 AM. We will review these results and proceed as indicated.  Labs from 11/2/2023: E2 13 PRO 6.4 FSH 12.3 LH 7.2 FT 4.4  C+S, cytology both negative. Phoned patient,  results with reminder that I continue to recommend evaluation for the microscopic hematuria. CT scan 1/24/2024: Benign, simple renal cysts L=12cm, R=2cm, no other renal lesions, stones or hydronephrosis, stable hepatic cyst, small RIH containing fat, mild BPH.  Patient seen TODAY 2/20/2024 via [facility] to review the above results and discuss completion of the hematuria evaluation with cystoscopy. He has not had any gross hematuria, and has  a history of chronic asymptomatic microscopic hematuria.

## 2024-03-24 NOTE — HISTORY OF PRESENT ILLNESS
[FreeTextEntry1] : 57 YO M seen 6/3/2021 as a NPT.  This patient was a prior patient of Dr. ALTAGRACIA BERMUDEZ and was last seen in 2017.  He has a history of a 4 mm right UPJ stone and a 6 mm left renal stone and these have been stable.  He comes today for interval follow-up of these stones.  He denies any renal colic has not passed a stone has no other gross hematuria no dysuria and nocturia once a night.  Patient tells me that he had a recent evaluation by his PCP and this included a ART and PSA evaluation he declines these today past history significant for right inguinal herniorrhaphy as a child.  IPSS  11/35 CONNOR 15/25 DULCE 0/10  UA small blood.  Urine cytology negative, C+S negative. CT scan 7/6/2021 9 x 10 x 7 CM left simple cyst, 1 mm left stone, 4 mm right stone chronic. PSA done by PCP by history.  Patient seen  7/14/2021 to review CT scan results and plan next steps. He has been asymptomatic since last visit. No gross hematuria. PSA from 5/20/2021 1.1 done at PCP, patient showed me the results from the patient portal on his phone.  UA trace heme IPSS 13/35 CONNOR 25/25 DULCE 5/10 We planned to follow the asymptomatic stones with KUB X-ray and periodic renal US.  Patient cancelled 1/12/2022, home with COVID, cancelled 2/29/2022.   Patient seen  4/21/2022 for interval FU. HA has been asymptomatic. No gross hematuria or renal colic. He had a KUB X-ray 4/20/2022. Report not ready, but I reviewed the films on the [facility] portal, no stones seen, bilateral pelvic phleboliths. He has noted no change in the LIH bulge. UA moderate RBC Renal US (in office): Mildly dilated lower pole calyces, 4 mm likely upper pole stone, not obstructing. stable 10 cm left simple cyst, no hydronephrosis or stones on the left.  We discussed today the results of the patient's physical examination, KUB x-ray, renal ultrasound. These indicate stable small radiolucent right renal calculus, stable large left simple cyst. He also has a left inguinal hernia which is primarily bulge and not a sac. This has also remained stable over the past year. I discussed and recommended that he continue to follow these issues without active intervention at this time. We reviewed signs and symptoms of incarceration of the hernia and appropriate steps to take if they should occur. As for the small kidney stone, we will follow this up in 1 year with a repeat renal ultrasound unless his symptoms change in the interim. We will also follow the left simple cyst since it does not require intervention right now. I also sent urine today for culture and cytology blood for PSA and BMP.  PSA 1.75 BMP normal C+S  Patient seen  11/2/2022 due to right flank pain radiating to the RLQ which started 3 weeks ago this was constant and severe at times but resolved 1 week ago. He now has a tingling feeling in the penis and frequency and urgency. He has not captured any stones but feels that one is passing. Denies gross hematuria. UA trace RBC IPSS 21 Findings today are consistent with the process of either passing or having passed the known 3 mm right upper pole kidney stone. I sent urine for culture today and asked the patient to return to the office later in the week for renal and pelvic ultrasound to confirm whether the stone is still present and to identify any signs of obstruction.  C+S NA  Patient seen 11/4/2022 for Renal and Pelvic US. UA trace RBC Renal US: left 11 cm simple cyst unchanged, no stones. right small simple cyst, prior stone not seen, no hydronephrosis or mass effect either kidney. Pelvic US: PVR 6 cc , 5 mm stone mobile in the bladder, Prostate 33 gm.  The patient denies fevers, chills, nausea and or vomiting and no unexplained weight loss.  All pertinent parts of the patient PFSH (past medical, family and social histories), laboratory, radiological studies and physician notes were reviewed prior to starting the face to face portion of the  visit. Questionnaire results were discussed with patient. We reviewed the results of the renal and pelvic ultrasound today which demonstrated no hydronephrosis and the passage of the known right kidney stone into the bladder. The stone was found to be mobile in the bladder and not obstructing the distal ureter. We discussed likely passage of the stone with minimal symptoms I recommended he try to capture the stone for analysis. We also sent urine today for culture and cytology and we will review these results as they come in. We made plans for follow-up in 6 weeks to repeat the bladder ultrasound to confirm stone passage if he does not capture the stone. C+S, cytology negative.  Patient seen 10/10/2023 to reassess. He told me that he had LIH repair with mesh 2/2023 and since then he has had persistent discomfort in the left groin and testis. He also recently had a TT level that returned significantly lower than last year at 51 (7/26/2023). At the same time, his PSA was found to be stable at 1.0. (Results reviewed on patient's phone). He denies any gross hematuria. UA trace RBC IPSS 12 DULCE 4 CONNOR not completed.  We discussed the patient's persistent microscopic hematuria by dipstick examination today and I recommended that we confirm this by microscopic evaluation, urine culture and urine cytology. These tests were sent. As to the persistent discomfort in the left groin and testicular area today associated with a very low testosterone level of 51, I recommended that he come for a full morning hormonal panel next week and come the following week for a scrotal ultrasound. At the same time we will also check on his potential stone burden with a renal ultrasound. I discussed with the patient the indications risks alternatives and chances for success with this approach and he is in agreement plans were made.  Micro UA, C+S, cytology all negative.  Patient seen 11/2/2023 for Renal and scrotal US and to reassess. He continues to have intermittent discomfort in the left testis. Denies any stone passage or gross hematuria, although he has had microscopic hematuria chronically, but always related to kidney stones. UA Small RBC Renal US: NO renal masses, 1.4 cm right renal cyst, 11.7 cm left simple cyst (this has increased ~1cm/year over the last 2 years, PVR 9 ml, Prostate 30 gm. Scrotal US: No masses, normal vasculature, no varicocele, normal epididymides. Right testis atrophic 5.6 ml, Left 11 ml.  We discussed the patient's chronic microscopic hematuria which is again present today with a small amount of blood seen. This is seen in the absence of any kidney stones by renal ultrasound today. He has never had an evaluation with cystoscopy for this chronic issue. I recommended that we consider CT scan of the abdomen and pelvis and I repeated a culture and cytology today. As for the history of kidney stones, none were identified by ultrasound today however we will check more closely with CT scan as noted above. As for the bilateral renal cysts and the enlarging left renal cyst, these will also be evaluated further with a CT scan. I also recommended that we consider cystoscopy after the CT scan. We discussed the indications, risks, alternatives and chances for success with this approach. As for the low testosterone level noted previously, a full hormone panel was sent today at approximately 11 AM. We will review these results and proceed as indicated.  Labs from 11/2/2023: E2 13 PRO 6.4 FSH 12.3 LH 7.2 FT 4.4  C+S, cytology both negative. Phoned patient,  results with reminder that I continue to recommend evaluation for the microscopic hematuria. CT scan 1/24/2024: Benign, simple renal cysts L=12cm, R=2cm, no other renal lesions, stones or hydronephrosis, stable hepatic cyst, small RIH containing fat, mild BPH.  Patient seen 2/20/2024 via [facility] to review the above results and discuss completion of the hematuria evaluation with cystoscopy. He has not had any gross hematuria, and has  a history of chronic asymptomatic microscopic hematuria.  We discussed the findings of the patient's recent CT scan which included normal upper urinary tract except for the bilateral simple renal cysts. I reviewed with him the benign nature of the cysts and we discussed that intervention is unnecessary at this time for the cysts. As for the small fat-containing right inguinal hernia seen on CT scan, he had a laparoscopic left inguinal hernia repair last year and this was not confirmed under direct visualization. The CT finding is likely the residual after his previous right inguinal herniorrhaphy performed through open procedure at age 8. He remains asymptomatic from this and no intervention is indicated at this time. As for the chronic microscopic hematuria, I recommended that we continue and complete his evaluation with cystoscopy in the office under local anesthesia with nitrous oxide augmentation. We reviewed the indications risks alternatives and chances for success with this approach and he is in agreement. Staff will contact him to schedule this procedure.  Patient seen TODAY 3/27/2024 for cystoscopy.

## 2024-03-27 ENCOUNTER — APPOINTMENT (OUTPATIENT)
Dept: UROLOGY | Facility: CLINIC | Age: 59
End: 2024-03-27

## 2024-04-17 NOTE — HISTORY OF PRESENT ILLNESS
[FreeTextEntry1] : 57 YO M seen 6/3/2021 as a NPT.  This patient was a prior patient of Dr. ALTAGRACIA BERMUDEZ and was last seen in 2017.  He has a history of a 4 mm right UPJ stone and a 6 mm left renal stone and these have been stable.  He comes today for interval follow-up of these stones.  He denies any renal colic has not passed a stone has no other gross hematuria no dysuria and nocturia once a night.  Patient tells me that he had a recent evaluation by his PCP and this included a ART and PSA evaluation he declines these today past history significant for right inguinal herniorrhaphy as a child.  IPSS  11/35 CONNOR 15/25 DULCE 0/10  UA small blood.  Urine cytology negative, C+S negative. CT scan 7/6/2021 9 x 10 x 7 CM left simple cyst, 1 mm left stone, 4 mm right stone chronic. PSA done by PCP by history.  Patient seen  7/14/2021 to review CT scan results and plan next steps. He has been asymptomatic since last visit. No gross hematuria. PSA from 5/20/2021 1.1 done at PCP, patient showed me the results from the patient portal on his phone.  UA trace heme IPSS 13/35 CONNOR 25/25 DULCE 5/10 We planned to follow the asymptomatic stones with KUB X-ray and periodic renal US.  Patient cancelled 1/12/2022, home with COVID, cancelled 2/29/2022.   Patient seen  4/21/2022 for interval FU. HA has been asymptomatic. No gross hematuria or renal colic. He had a KUB X-ray 4/20/2022. Report not ready, but I reviewed the films on the [facility] portal, no stones seen, bilateral pelvic phleboliths. He has noted no change in the LIH bulge. UA moderate RBC Renal US (in office): Mildly dilated lower pole calyces, 4 mm likely upper pole stone, not obstructing. stable 10 cm left simple cyst, no hydronephrosis or stones on the left.  We discussed today the results of the patient's physical examination, KUB x-ray, renal ultrasound. These indicate stable small radiolucent right renal calculus, stable large left simple cyst. He also has a left inguinal hernia which is primarily bulge and not a sac. This has also remained stable over the past year. I discussed and recommended that he continue to follow these issues without active intervention at this time. We reviewed signs and symptoms of incarceration of the hernia and appropriate steps to take if they should occur. As for the small kidney stone, we will follow this up in 1 year with a repeat renal ultrasound unless his symptoms change in the interim. We will also follow the left simple cyst since it does not require intervention right now. I also sent urine today for culture and cytology blood for PSA and BMP.  PSA 1.75 BMP normal C+S  Patient seen  11/2/2022 due to right flank pain radiating to the RLQ which started 3 weeks ago this was constant and severe at times but resolved 1 week ago. He now has a tingling feeling in the penis and frequency and urgency. He has not captured any stones but feels that one is passing. Denies gross hematuria. UA trace RBC IPSS 21 Findings today are consistent with the process of either passing or having passed the known 3 mm right upper pole kidney stone. I sent urine for culture today and asked the patient to return to the office later in the week for renal and pelvic ultrasound to confirm whether the stone is still present and to identify any signs of obstruction.  C+S NA  Patient seen 11/4/2022 for Renal and Pelvic US. UA trace RBC Renal US: left 11 cm simple cyst unchanged, no stones. right small simple cyst, prior stone not seen, no hydronephrosis or mass effect either kidney. Pelvic US: PVR 6 cc , 5 mm stone mobile in the bladder, Prostate 33 gm.  The patient denies fevers, chills, nausea and or vomiting and no unexplained weight loss.  All pertinent parts of the patient PFSH (past medical, family and social histories), laboratory, radiological studies and physician notes were reviewed prior to starting the face to face portion of the  visit. Questionnaire results were discussed with patient. We reviewed the results of the renal and pelvic ultrasound today which demonstrated no hydronephrosis and the passage of the known right kidney stone into the bladder. The stone was found to be mobile in the bladder and not obstructing the distal ureter. We discussed likely passage of the stone with minimal symptoms I recommended he try to capture the stone for analysis. We also sent urine today for culture and cytology and we will review these results as they come in. We made plans for follow-up in 6 weeks to repeat the bladder ultrasound to confirm stone passage if he does not capture the stone. C+S, cytology negative.  Patient seen 10/10/2023 to reassess. He told me that he had LIH repair with mesh 2/2023 and since then he has had persistent discomfort in the left groin and testis. He also recently had a TT level that returned significantly lower than last year at 51 (7/26/2023). At the same time, his PSA was found to be stable at 1.0. (Results reviewed on patient's phone). He denies any gross hematuria. UA trace RBC IPSS 12 DULCE 4 CONNOR not completed.  We discussed the patient's persistent microscopic hematuria by dipstick examination today and I recommended that we confirm this by microscopic evaluation, urine culture and urine cytology. These tests were sent. As to the persistent discomfort in the left groin and testicular area today associated with a very low testosterone level of 51, I recommended that he come for a full morning hormonal panel next week and come the following week for a scrotal ultrasound. At the same time we will also check on his potential stone burden with a renal ultrasound. I discussed with the patient the indications risks alternatives and chances for success with this approach and he is in agreement plans were made.  Micro UA, C+S, cytology all negative.  Patient seen 11/2/2023 for Renal and scrotal US and to reassess. He continues to have intermittent discomfort in the left testis. Denies any stone passage or gross hematuria, although he has had microscopic hematuria chronically, but always related to kidney stones. UA Small RBC Renal US: NO renal masses, 1.4 cm right renal cyst, 11.7 cm left simple cyst (this has increased ~1cm/year over the last 2 years, PVR 9 ml, Prostate 30 gm. Scrotal US: No masses, normal vasculature, no varicocele, normal epididymides. Right testis atrophic 5.6 ml, Left 11 ml.  We discussed the patient's chronic microscopic hematuria which is again present today with a small amount of blood seen. This is seen in the absence of any kidney stones by renal ultrasound today. He has never had an evaluation with cystoscopy for this chronic issue. I recommended that we consider CT scan of the abdomen and pelvis and I repeated a culture and cytology today. As for the history of kidney stones, none were identified by ultrasound today however we will check more closely with CT scan as noted above. As for the bilateral renal cysts and the enlarging left renal cyst, these will also be evaluated further with a CT scan. I also recommended that we consider cystoscopy after the CT scan. We discussed the indications, risks, alternatives and chances for success with this approach. As for the low testosterone level noted previously, a full hormone panel was sent today at approximately 11 AM. We will review these results and proceed as indicated.  Labs from 11/2/2023: E2 13 PRO 6.4 FSH 12.3 LH 7.2 FT 4.4  C+S, cytology both negative. Phoned patient,  results with reminder that I continue to recommend evaluation for the microscopic hematuria. CT scan 1/24/2024: Benign, simple renal cysts L=12cm, R=2cm, no other renal lesions, stones or hydronephrosis, stable hepatic cyst, small RIH containing fat, mild BPH.  Patient seen 2/20/2024 via [facility] to review the above results and discuss completion of the hematuria evaluation with cystoscopy. He has not had any gross hematuria, and has  a history of chronic asymptomatic microscopic hematuria.  We discussed the findings of the patient's recent CT scan which included normal upper urinary tract except for the bilateral simple renal cysts. I reviewed with him the benign nature of the cysts and we discussed that intervention is unnecessary at this time for the cysts. As for the small fat-containing right inguinal hernia seen on CT scan, he had a laparoscopic left inguinal hernia repair last year and this was not confirmed under direct visualization. The CT finding is likely the residual after his previous right inguinal herniorrhaphy performed through open procedure at age 8. He remains asymptomatic from this and no intervention is indicated at this time. As for the chronic microscopic hematuria, I recommended that we continue and complete his evaluation with cystoscopy in the office under local anesthesia with nitrous oxide augmentation. We reviewed the indications risks alternatives and chances for success with this approach and he is in agreement. Staff will contact him to schedule this procedure. Patient cancelled cystoscopy 3/27/2024.  Patient seen TODAY 4/19/2024 for cystoscopy.

## 2024-04-19 ENCOUNTER — APPOINTMENT (OUTPATIENT)
Dept: UROLOGY | Facility: CLINIC | Age: 59
End: 2024-04-19
Payer: COMMERCIAL

## 2024-04-19 DIAGNOSIS — R31.29 OTHER MICROSCOPIC HEMATURIA: ICD-10-CM

## 2024-04-19 DIAGNOSIS — N21.0 CALCULUS IN BLADDER: ICD-10-CM

## 2024-04-19 PROCEDURE — 81003 URINALYSIS AUTO W/O SCOPE: CPT | Mod: QW

## 2024-07-18 LAB
APPEARANCE: CLEAR
BACTERIA: NEGATIVE /HPF
BILIRUBIN URINE: NEGATIVE
BLOOD URINE: NEGATIVE
CAST: 0 /LPF
COLOR: YELLOW
EPITHELIAL CELLS: 0 /HPF
GLUCOSE QUALITATIVE U: NEGATIVE MG/DL
KETONES URINE: NEGATIVE MG/DL
LEUKOCYTE ESTERASE URINE: NEGATIVE
MICROSCOPIC-UA: NORMAL
NITRITE URINE: NEGATIVE
PH URINE: 6
PROTEIN URINE: NEGATIVE MG/DL
RED BLOOD CELLS URINE: 0 /HPF
SPECIFIC GRAVITY URINE: 1.01
URINE CYTOLOGY: NORMAL
UROBILINOGEN URINE: 0.2 MG/DL
WHITE BLOOD CELLS URINE: 0 /HPF

## 2024-07-26 LAB — BACTERIA UR CULT: NORMAL

## 2024-09-05 ENCOUNTER — NON-APPOINTMENT (OUTPATIENT)
Age: 59
End: 2024-09-05

## 2024-09-10 ENCOUNTER — NON-APPOINTMENT (OUTPATIENT)
Age: 59
End: 2024-09-10

## 2024-12-05 ENCOUNTER — APPOINTMENT (OUTPATIENT)
Dept: UROLOGY | Facility: CLINIC | Age: 59
End: 2024-12-05

## 2024-12-05 DIAGNOSIS — N21.0 CALCULUS IN BLADDER: ICD-10-CM

## 2024-12-05 DIAGNOSIS — R31.29 OTHER MICROSCOPIC HEMATURIA: ICD-10-CM

## 2025-01-16 ENCOUNTER — NON-APPOINTMENT (OUTPATIENT)
Age: 60
End: 2025-01-16

## 2025-01-16 ENCOUNTER — APPOINTMENT (OUTPATIENT)
Dept: UROLOGY | Facility: CLINIC | Age: 60
End: 2025-01-16
Payer: COMMERCIAL

## 2025-01-16 VITALS
SYSTOLIC BLOOD PRESSURE: 144 MMHG | HEART RATE: 83 BPM | OXYGEN SATURATION: 95 % | TEMPERATURE: 98.2 F | DIASTOLIC BLOOD PRESSURE: 92 MMHG

## 2025-01-16 DIAGNOSIS — R79.89 OTHER SPECIFIED ABNORMAL FINDINGS OF BLOOD CHEMISTRY: ICD-10-CM

## 2025-01-16 DIAGNOSIS — R31.29 OTHER MICROSCOPIC HEMATURIA: ICD-10-CM

## 2025-01-16 DIAGNOSIS — N21.0 CALCULUS IN BLADDER: ICD-10-CM

## 2025-01-16 LAB
BILIRUB UR QL STRIP: NORMAL
CLARITY UR: CLEAR
COLLECTION METHOD: NORMAL
GLUCOSE UR-MCNC: NORMAL
HCG UR QL: 0.2 EU/DL
HGB UR QL STRIP.AUTO: NORMAL
KETONES UR-MCNC: NORMAL
LEUKOCYTE ESTERASE UR QL STRIP: NORMAL
NITRITE UR QL STRIP: NORMAL
PH UR STRIP: 6
PROT UR STRIP-MCNC: NORMAL
SP GR UR STRIP: 1

## 2025-01-16 PROCEDURE — 51798 US URINE CAPACITY MEASURE: CPT

## 2025-01-16 PROCEDURE — 81003 URINALYSIS AUTO W/O SCOPE: CPT | Mod: QW

## 2025-01-16 PROCEDURE — 99214 OFFICE O/P EST MOD 30 MIN: CPT | Mod: 25

## 2025-01-17 ENCOUNTER — NON-APPOINTMENT (OUTPATIENT)
Age: 60
End: 2025-01-17

## 2025-01-17 LAB
APPEARANCE: CLEAR
BACTERIA: NEGATIVE /HPF
BILIRUBIN URINE: NEGATIVE
BLOOD URINE: NEGATIVE
CAST: 0 /LPF
COLOR: YELLOW
EPITHELIAL CELLS: 0 /HPF
GLUCOSE QUALITATIVE U: NEGATIVE MG/DL
KETONES URINE: NEGATIVE MG/DL
LEUKOCYTE ESTERASE URINE: NEGATIVE
MICROSCOPIC-UA: NORMAL
NITRITE URINE: NEGATIVE
PH URINE: 6.5
PROTEIN URINE: NEGATIVE MG/DL
RED BLOOD CELLS URINE: 0 /HPF
SPECIFIC GRAVITY URINE: 1
UROBILINOGEN URINE: 0.2 MG/DL
WHITE BLOOD CELLS URINE: 0 /HPF

## 2025-01-22 LAB
BACTERIA UR CULT: NORMAL
HLX UV FISH FINAL REPORT: NORMAL

## 2025-06-26 ENCOUNTER — APPOINTMENT (OUTPATIENT)
Dept: UROLOGY | Facility: CLINIC | Age: 60
End: 2025-06-26